# Patient Record
Sex: FEMALE | Race: BLACK OR AFRICAN AMERICAN
[De-identification: names, ages, dates, MRNs, and addresses within clinical notes are randomized per-mention and may not be internally consistent; named-entity substitution may affect disease eponyms.]

---

## 2018-05-21 ENCOUNTER — HOSPITAL ENCOUNTER (OUTPATIENT)
Dept: HOSPITAL 74 - JRADIR | Age: 53
Discharge: HOME | End: 2018-05-21
Attending: SURGERY
Payer: COMMERCIAL

## 2018-05-21 DIAGNOSIS — E04.1: Primary | ICD-10-CM

## 2018-05-21 PROCEDURE — 0GJK3ZZ INSPECTION OF THYROID GLAND, PERCUTANEOUS APPROACH: ICD-10-PCS | Performed by: RADIOLOGY

## 2018-05-22 NOTE — PATH
Cytology Non-Gynecological Report



Patient Name:  FILIBERTO DUNLAP

Accession #:  

[facility]. Rec. #:  [id number]                                                        

   /Age/Gender:  1965 (Age: 52) / F

Account:  [id number]                                                          

             Location: [facility]

Taken:  2018

Received:  2018

Reported:  2018

Physicians:  LACY Victor M.D.

Copy To:  THYROSEQ



Specimen(s) Received

 LEFT THYROID FNA 





Clinical History

Left thyroid nodule, 2.52 x 0.97 x 2.05 cm







Final Diagnosis

THYROID, LEFT, FINE NEEDLE ASPIRATION:

SATISFACTORY FOR EVALUATION.

BETHESDA CLASS II: BENIGN.

CYTOLOGIC FINDINGS ARE CONSISTENT WITH A BENIGN FOLLICULAR NODULE. 

SMALL FOLLICULAR CELLS AND COLLOID PRESENT.





***Electronically Signed***

Taina Fernandez M.D.





Gross Description

Received are eight direct smears, four of which are air-dried and Diff-Quik

stained, and four of which are alcohol fixed and Pap stained.  Also received is

15 ml of bloody formalin from which one cellblock is prepared. Received is a 1.5

ml molecular ThyroSeq tube.